# Patient Record
Sex: FEMALE | Race: WHITE | NOT HISPANIC OR LATINO | URBAN - METROPOLITAN AREA
[De-identification: names, ages, dates, MRNs, and addresses within clinical notes are randomized per-mention and may not be internally consistent; named-entity substitution may affect disease eponyms.]

---

## 2018-12-19 ENCOUNTER — OUTPATIENT (OUTPATIENT)
Dept: OUTPATIENT SERVICES | Facility: HOSPITAL | Age: 28
LOS: 1 days | Discharge: HOME | End: 2018-12-19

## 2018-12-21 DIAGNOSIS — K02.52 DENTAL CARIES ON PIT AND FISSURE SURFACE PENETRATING INTO DENTIN: ICD-10-CM

## 2019-08-27 ENCOUNTER — EMERGENCY (EMERGENCY)
Facility: HOSPITAL | Age: 29
LOS: 0 days | Discharge: HOME | End: 2019-08-27
Attending: EMERGENCY MEDICINE | Admitting: EMERGENCY MEDICINE
Payer: SUBSIDIZED

## 2019-08-27 VITALS
DIASTOLIC BLOOD PRESSURE: 58 MMHG | HEART RATE: 73 BPM | OXYGEN SATURATION: 99 % | RESPIRATION RATE: 18 BRPM | TEMPERATURE: 97 F | SYSTOLIC BLOOD PRESSURE: 124 MMHG
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
## 2019-08-27 DIAGNOSIS — M62.830 MUSCLE SPASM OF BACK: ICD-10-CM
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
## 2019-08-27 DIAGNOSIS — M54.5 LOW BACK PAIN: ICD-10-CM
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
## 2019-08-27 DIAGNOSIS — M54.9 DORSALGIA, UNSPECIFIED: ICD-10-CM

## 2019-08-27 PROCEDURE — 99283 EMERGENCY DEPT VISIT LOW MDM: CPT

## 2019-08-27 RX ORDER — METHOCARBAMOL 500 MG/1
1500 TABLET, FILM COATED ORAL ONCE
Refills: 0 | Status: COMPLETED | OUTPATIENT
Start: 2019-08-27 | End: 2019-08-27

## 2019-08-27 RX ORDER — METHOCARBAMOL 500 MG/1
2 TABLET, FILM COATED ORAL
Qty: 56 | Refills: 0
Start: 2019-08-27 | End: 2019-09-02

## 2019-08-27 RX ORDER — KETOROLAC TROMETHAMINE 30 MG/ML
15 SYRINGE (ML) INJECTION ONCE
Refills: 0 | Status: DISCONTINUED | OUTPATIENT
Start: 2019-08-27 | End: 2019-08-27

## 2019-08-27 RX ADMIN — METHOCARBAMOL 1500 MILLIGRAM(S): 500 TABLET, FILM COATED ORAL at 17:41

## 2019-08-27 RX ADMIN — Medication 15 MILLIGRAM(S): at 17:41

## 2019-08-27 NOTE — ED PROVIDER NOTE - NS ED ROS FT
Constitutional:  No fevers or chills.  Eyes:  No visual changes, eye pain, or discharge.  ENT:  No hearing changes.  Neck:  No neck pain.  Cardiac:  No CP.  Resp:  No cough or SOB.  GI:  No nausea, vomiting, diarrhea, or abdominal pain.  :  No dysuria, frequency, hematuria, or incontinence.  MSK:  +Back pain.  Neuro:  No headache, dizziness, or weakness. +Tingling R thigh.  Skin:  No skin rash.

## 2019-08-27 NOTE — ED ADULT NURSE NOTE - NSIMPLEMENTINTERV_GEN_ALL_ED
Implemented All Universal Safety Interventions:  [place] to call system. Call bell, personal items and telephone within reach. Instruct patient to call for assistance. Room bathroom lighting operational. Non-slip footwear when patient is off stretcher. Physically safe environment: no spills, clutter or unnecessary equipment. Stretcher in lowest position, wheels locked, appropriate side rails in place.

## 2019-08-27 NOTE — ED PROVIDER NOTE - PROGRESS NOTE DETAILS
Patient given Robaxin and Toradol. Encouraged close outpatient f/u. Strict return precaution signs/sxs given.

## 2019-08-27 NOTE — ED PROVIDER NOTE - OBJECTIVE STATEMENT
27yo F with PMH of low back pain presenting to ED with low paraspinal muscular back pain x 2-3 weeks with radiation down R leg. States she works at the [employer] and does heavy lifting, up to 40 pounds. Pain is constant, pressure sensation, non-radiating, worse with exertion/heavy lifting. Denies any direct injuries/traumas/falls, CP, SOB, abd pain, or fecal/urinary incontinence. Endorsing tingling down R leg. +Smoker, no other drug use. Took 1000mg of Advil at around 11AM today. Period is due in 3 days. Able to ambulate without difficulty.

## 2019-08-27 NOTE — ED PROVIDER NOTE - PHYSICAL EXAMINATION
PHYSICAL EXAM: I have reviewed current vital signs.  GENERAL: NAD, well-nourished; well-developed.  HEAD:  Normocephalic, atraumatic.  EYES: Conjunctiva and sclera clear.  ENT: MMM.  NECK: Supple, no midline TTP, full ROM.  CHEST/LUNG: Clear to auscultation bilaterally; no wheezes, rales, or rhonchi.  HEART: Regular rate and rhythm, normal S1 and S2; no murmurs, rubs, or gallops.  ABDOMEN: Soft, nontender, nondistended.  EXTREMITIES:  2+ peripheral pulses; FROM.  MSK: No spinal midline TTP, full ROM. +Bilateral lower paraspinal muscular TTP.  PSYCH: Cooperative, appropriate, normal mood and affect.  NEUROLOGY: A&O x 3. Motor 5/5 with full strength. Sensory intact. No focal neurological deficits.   SKIN: Warm and dry.

## 2019-08-27 NOTE — ED ADULT NURSE NOTE - OBJECTIVE STATEMENT
Patient presents with lower back pain radiating down to both legs. 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 Verbalizes that she is having difficulty walking. Denies any other symptoms at this time. Took advil but states no relief. No pmh.

## 2019-08-27 NOTE — ED PROVIDER NOTE - CARE PROVIDER_API CALL
Jm Dominguez)  Neurological Surgery  [address] [place] [address]  Phone: [phone]  Fax: [phone]  Follow Up Time:

## 2019-08-27 NOTE — ED PROVIDER NOTE - CLINICAL SUMMARY MEDICAL DECISION MAKING FREE TEXT BOX
29yo F no significant past medical history presenting with lower back pain for several months, worse today while at work- does a lot of heavy lifting at work, no trauma, pain sometimes radiates down RLE. No numbness/focal weakness. No diff ambulating. Pain Constant, moderate,pressure, non-radiating, gradual onset. No other exacerbating or alleviating factors. No trauma, no urinary/fecal incontinence/retention, no fevers/chills, no hx IV drug use, no hx malignancy, no numbness/focal weakness. Comfortable with discharge and follow-up outpatient, strict return precautions given. Endorses understanding of all of this and aware that they can return at any time for new or concerning symptoms. No further questions or concerns at this time 27yo F no significant past medical history presenting with lower back pain for several months, worse today while at work- does a lot of heavy lifting at work, no trauma, pain sometimes radiates down RLE. No numbness/focal weakness. No diff ambulating. Pain Constant, moderate, pressure, non-radiating, gradual onset. No other exacerbating or alleviating factors. No trauma, no urinary/fecal incontinence/retention, no fevers/chills, no hx IV drug use, no hx malignancy, no numbness/focal weakness. Comfortable with discharge and follow-up outpatient, strict return precautions given. Endorses understanding of all of this and aware that they can return at any time for new or concerning symptoms. No further questions or concerns at this time

## 2019-08-27 NOTE — ED PROVIDER NOTE - PATIENT PORTAL LINK FT
You can access the FollowMyHealth Patient Portal offered by [facility] by registering at the following website: [email or web address]. By joining [facility]’s FollowMyHealth portal, you will also be able to view your health information using other applications (apps) compatible with our system.

## 2019-08-27 NOTE — ED PROVIDER NOTE - ATTENDING CONTRIBUTION TO CARE
29yo F no significant past medical history presenting with lower back pain for several months, worse today while at work- does a lot of heavy lifting at work, no trauma, pain sometimes radiates down RLE. No numbness/focal weakness. No diff ambulating. Pain Constant, moderate,pressure, non-radiating, gradual onset. No other exacerbating or alleviating factors. No trauma, no urinary/fecal incontinence/retention, no fevers/chills, no hx IV drug use, no hx malignancy, no numbness/focal weakness. +Ambulatory  Constitutional: Well appearing. No acute distress. Non toxic.   Eyes: PERRLA. Extraocular movements intact, no entrapment. Conjunctiva normal.   ENT: No nasal discharge. Moist mucus membranes.  Neck: Supple, non tender, full range of motion.  Ext: Warm and well perfused x4, moving all extremities, no edema.   Psy: Cooperative, appropriate.   Skin: Warm, dry, no rash  Neuro: CN2-12 grossly intact no sensory or motor deficits throughout, no drift, no ataxia  Back: No midline ttp throughout. No saddle anesthesia.   ap- back pain, meds, reassess

## 2020-08-28 PROBLEM — M54.9 DORSALGIA, UNSPECIFIED: Chronic | Status: ACTIVE | Noted: 2019-08-27

## 2020-09-02 ENCOUNTER — OUTPATIENT (OUTPATIENT)
Dept: OUTPATIENT SERVICES | Facility: HOSPITAL | Age: 30
LOS: 1 days | Discharge: HOME | End: 2020-09-02
Payer: OTHER MISCELLANEOUS

## 2020-09-02 DIAGNOSIS — M54.5 LOW BACK PAIN: ICD-10-CM

## 2020-09-02 PROCEDURE — 72148 MRI LUMBAR SPINE W/O DYE: CPT | Mod: 26

## 2021-10-11 ENCOUNTER — OUTPATIENT (OUTPATIENT)
Dept: OUTPATIENT SERVICES | Facility: HOSPITAL | Age: 31
LOS: 1 days | Discharge: HOME | End: 2021-10-11
Payer: COMMERCIAL

## 2021-10-11 DIAGNOSIS — M54.50 LOW BACK PAIN, UNSPECIFIED: ICD-10-CM

## 2021-10-11 PROCEDURE — 72148 MRI LUMBAR SPINE W/O DYE: CPT | Mod: 26

## 2022-04-22 NOTE — ED PROVIDER NOTE - DATE/TIME 1
-- DO NOT REPLY / DO NOT REPLY ALL --  -- Message is from the [facility]--    General Patient Message      Reason for Call: [facility] pharmacy is calling due to the fact they need clarity on amitriptyline patient   75 Mg yesterday for 90 days and pharmacy received a second prescription for 50  MG take 1 and 1/2 pills a day please give pharmacy a call as soon as possible.    Caller Information       Type Contact Phone    04/22/2022 03:31 PM CDT Phone (Incoming) [id number] - [place], [place] - [address] AT [facility] (Pharmacy) [phone]          Alternative phone number: none    Turnaround time given to caller:   \"This message will be sent to [state Provider's name]. The clinical team will fulfill your request as soon as they review your message.\"     27-Aug-2019 18:11

## 2023-02-07 PROBLEM — Z00.00 ENCOUNTER FOR PREVENTIVE HEALTH EXAMINATION: Status: ACTIVE | Noted: 2023-02-07

## 2023-02-09 ENCOUNTER — APPOINTMENT (OUTPATIENT)
Dept: NEUROSURGERY | Facility: CLINIC | Age: 33
End: 2023-02-09
Payer: OTHER MISCELLANEOUS

## 2023-02-09 VITALS — HEIGHT: 72 IN | WEIGHT: 270 LBS | BODY MASS INDEX: 36.57 KG/M2

## 2023-02-09 DIAGNOSIS — Z84.89 FAMILY HISTORY OF OTHER SPECIFIED CONDITIONS: ICD-10-CM

## 2023-02-09 DIAGNOSIS — F17.200 NICOTINE DEPENDENCE, UNSPECIFIED, UNCOMPLICATED: ICD-10-CM

## 2023-02-09 DIAGNOSIS — Z81.8 FAMILY HISTORY OF OTHER MENTAL AND BEHAVIORAL DISORDERS: ICD-10-CM

## 2023-02-09 DIAGNOSIS — Z80.9 FAMILY HISTORY OF MALIGNANT NEOPLASM, UNSPECIFIED: ICD-10-CM

## 2023-02-09 DIAGNOSIS — Z82.49 FAMILY HISTORY OF ISCHEMIC HEART DISEASE AND OTHER DISEASES OF THE CIRCULATORY SYSTEM: ICD-10-CM

## 2023-02-09 PROCEDURE — 99072 ADDL SUPL MATRL&STAF TM PHE: CPT

## 2023-02-09 PROCEDURE — 99204 OFFICE O/P NEW MOD 45 MIN: CPT

## 2023-02-09 RX ORDER — TRAMADOL HYDROCHLORIDE 25 MG/1
TABLET, COATED ORAL
Refills: 0 | Status: ACTIVE | COMMUNITY

## 2023-02-09 RX ORDER — PREGABALIN 300 MG/1
CAPSULE ORAL
Refills: 0 | Status: ACTIVE | COMMUNITY

## 2023-02-09 RX ORDER — NABUMETONE 750 MG/1
750 TABLET, FILM COATED ORAL
Refills: 0 | Status: ACTIVE | COMMUNITY

## 2023-02-09 NOTE — HISTORY OF PRESENT ILLNESS
[de-identified] : DOI: 7/23/2020\par \par I am seeing Jennifer in consultation at the request of Dr. KENNEDY her pain management physician.  Jennifer is a  she had a workplace injury lifting heavy boxes in 2020 at the time she felt pain in the low back and then pain radiating into the bilateral lower extremities right worse than left this has persisted she has pain numbness and tingling she has been unable to work since 2020.  She started more than 6 months of physical therapy and this time without any significant relief she had multiple epidural steroid injections with Dr. Paige of pain management.  She had no significant relief she had multiple EMGs of which I do not have the results but we will attempt to obtain them.  Recent MRI of the lumbosacral spine shows some disc degeneration at L5-S1 with a small central herniation as well as facet hypertrophy/arthrosis.  She has no significant central or foraminal stenosis noted.\par \par She underwent evocative discography with Dr. KENNEDY of pain management it was negative at the L3-4 and L4-5 disc spaces however it was significantly positive at L5-S1 where he was able to  reproduce her pain exactly.  Dr. KENNEDY has referred her to discuss possible fusion surgery given the results of the discography

## 2023-02-09 NOTE — PLAN
[FreeTextEntry1] : We will have Jennifer obtain lumbar flexion-extension x-rays to assess for any gross instability and to further discuss the possibility of surgical intervention with all relevant diagnostic imaging.\par \par I will see her back to discuss the results of the x-rays and to make a further plan.

## 2023-04-26 ENCOUNTER — OUTPATIENT (OUTPATIENT)
Dept: OUTPATIENT SERVICES | Facility: HOSPITAL | Age: 33
LOS: 1 days | End: 2023-04-26
Payer: COMMERCIAL

## 2023-04-26 DIAGNOSIS — M54.50 LOW BACK PAIN, UNSPECIFIED: ICD-10-CM

## 2023-04-26 PROCEDURE — 72110 X-RAY EXAM L-2 SPINE 4/>VWS: CPT | Mod: 26

## 2023-04-26 PROCEDURE — 72110 X-RAY EXAM L-2 SPINE 4/>VWS: CPT

## 2023-04-27 DIAGNOSIS — M54.50 LOW BACK PAIN, UNSPECIFIED: ICD-10-CM

## 2023-06-15 ENCOUNTER — APPOINTMENT (OUTPATIENT)
Dept: NEUROSURGERY | Facility: CLINIC | Age: 33
End: 2023-06-15
Payer: OTHER MISCELLANEOUS

## 2023-06-15 DIAGNOSIS — M54.50 LOW BACK PAIN, UNSPECIFIED: ICD-10-CM

## 2023-06-15 PROCEDURE — 99214 OFFICE O/P EST MOD 30 MIN: CPT

## 2023-06-15 NOTE — ASSESSMENT
[FreeTextEntry1] : This is a 31 yo F who presents for neurosurgical revisit. We have reviewed all imaging/testing with patient at this time and she expresses understanding.\par \par Patient with lumbar radicular complaints in the setting of a fairly benign MRI. I acknowledge a trace disc bulge at the sight but there is no neuroforaminal narrowing and canals remains patent. Mild facet arthrosis noted but again, I have educated her that I do not feel as though neurosurgical intervention would provide her with any valuable improvements in pain or quality of life. SCS trial can be considered.\par \par All questions and concerns have been addressed at this time and she wishes to discuss this with Dr. KENNEDY. She will return to the office in 4 weeks for continued care/treatment efforts.\par \par Deana Mansfield PA-C\par Arik Kaiser MD\par

## 2023-06-15 NOTE — HISTORY OF PRESENT ILLNESS
[FreeTextEntry1] : This is a 31 yo F who presents for neurosurgical follow-up. As a review, she sustained a workplace injury in 2020 while employed at [employer]. After lifting heavy boxes she had noted severe low back pain with shooting pain into the right lower extremity. Since that time she has attempted conservative efforts in the form of physical therapy along with interventional treatments in the form of lumbar epidurals which failed to provide relief. A discogram performed by Dr. Fraga at L5-S1 revealed concordant pain, negative at L3-4, L4-5.\par \par At this time, we have reviewed her x-ray lumbar flex/ext as well as her prior MR L Spine. X-ray imaging reveals a trace listhesis at L4-5 with flex/ext. Prior MR L spine 1/2023 (R) reveals a trace disc bulge at L5-S1 without neuroforaminal or central canal stenosis.\par \par PHYSICAL EXAM: \par Neurologic: \par CN II-XII grossly intact\par ROM: restricted with ROM\par Palpation: (+) lumbar paravertebral tenderness to palpation\par Strength: Full strength in all major muscle groups, no atrophy, except right LE 4+/5 pain limited\par Sensation: Full sensation to light touch in all extremities\par Reflexes: \par  2+ patellar\par  2+ biceps\par  2+ ankle jerk\par  No Tran's\par  No clonus\par  No babinski\par \par Signs:\par SLR negative\par L'hermitte's negative\par \par Gait: antalgic \par \par \par

## 2023-08-30 NOTE — ED ADULT NURSE NOTE - NSFALLRSKHARMRISK_ED_ALL_ED
Pt called asking questions about her referrals. Pt is unsure on what they are for. Pt just wants a better understanding on why or what she is going to with the referrals.    Pt also mention about some orders that were made by other Physicans wants to go over them also not sure.    Please advise    no